# Patient Record
Sex: FEMALE | Race: WHITE | NOT HISPANIC OR LATINO | Employment: FULL TIME | URBAN - NONMETROPOLITAN AREA
[De-identification: names, ages, dates, MRNs, and addresses within clinical notes are randomized per-mention and may not be internally consistent; named-entity substitution may affect disease eponyms.]

---

## 2018-06-19 ENCOUNTER — LAB (OUTPATIENT)
Dept: LAB | Facility: HOSPITAL | Age: 48
End: 2018-06-19
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
## 2018-06-19 PROCEDURE — 86618 LYME DISEASE ANTIBODY: CPT | Performed by: NURSE PRACTITIONER

## 2018-06-19 PROCEDURE — 86757 RICKETTSIA ANTIBODY: CPT

## 2018-06-19 PROCEDURE — 86666 EHRLICHIA ANTIBODY: CPT | Performed by: NURSE PRACTITIONER

## 2018-06-22 LAB
R RICKETTSI IGG SER QL IA: POSITIVE
R RICKETTSI IGM TITR SER: 1.13 INDEX (ref 0–0.89)

## 2018-06-29 ENCOUNTER — TELEPHONE (OUTPATIENT)
Dept: FAMILY MEDICINE CLINIC | Facility: CLINIC | Age: 48
End: 2018-06-29

## 2018-06-29 ENCOUNTER — OFFICE VISIT (OUTPATIENT)
Dept: FAMILY MEDICINE CLINIC | Facility: CLINIC | Age: 48
End: 2018-06-29

## 2018-06-29 VITALS
HEIGHT: 66 IN | DIASTOLIC BLOOD PRESSURE: 64 MMHG | WEIGHT: 193 LBS | BODY MASS INDEX: 31.02 KG/M2 | SYSTOLIC BLOOD PRESSURE: 108 MMHG
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
## 2018-06-29 DIAGNOSIS — R53.81 MALAISE AND FATIGUE: ICD-10-CM

## 2018-06-29 DIAGNOSIS — Z12.31 ENCOUNTER FOR SCREENING MAMMOGRAM FOR MALIGNANT NEOPLASM OF BREAST: ICD-10-CM

## 2018-06-29 DIAGNOSIS — R53.83 MALAISE AND FATIGUE: ICD-10-CM

## 2018-06-29 DIAGNOSIS — Z00.00 GENERAL MEDICAL EXAM: ICD-10-CM

## 2018-06-29 DIAGNOSIS — A77.0 RMSF (ROCKY MOUNTAIN SPOTTED FEVER): Primary | ICD-10-CM

## 2018-06-29 DIAGNOSIS — Z12.11 ENCOUNTER FOR SCREENING COLONOSCOPY: ICD-10-CM

## 2018-06-29 PROCEDURE — 99213 OFFICE O/P EST LOW 20 MIN: CPT | Performed by: NURSE PRACTITIONER

## 2018-06-29 RX ORDER — DOXYCYCLINE HYCLATE 100 MG/1
100 CAPSULE ORAL 2 TIMES DAILY
Qty: 14 CAPSULE | Refills: 0 | Status: SHIPPED | OUTPATIENT
Start: 2018-06-29 | End: 2018-07-13

## 2018-06-29 NOTE — PROGRESS NOTES
Chief Complaint   Patient presents with   • Establish Care     has seen in the past    • RMSF     Subjective   Pati Prado is a 48 y.o. female.     Presents with follow up from urgent care -dx with rmsf       Rash   This is a new problem. The current episode started in the past 7 days. The problem has been gradually worsening since onset. The affected locations include the right ankle. The rash is characterized by pain and itchiness. Associated with: ticks  Associated symptoms include fatigue, a fever and joint pain. Pertinent negatives include no anorexia, congestion, cough, diarrhea, eye pain, facial edema, nail changes, rhinorrhea, shortness of breath, sore throat or vomiting. Past treatments include anti-itch cream (few days on doxycycline ). The treatment provided mild relief. There is no history of allergies, asthma, eczema or varicella.   Fatigue   This is a recurrent problem. The current episode started more than 1 month ago. The problem occurs intermittently. The problem has been gradually worsening. Associated symptoms include abdominal pain, diaphoresis, fatigue, a fever and a rash. Pertinent negatives include no anorexia, arthralgias, change in bowel habit, chest pain, chills, congestion, coughing, headaches, joint swelling, nausea, neck pain, sore throat, swollen glands, urinary symptoms, vertigo, visual change or vomiting. Treatments tried: doxycycline  The treatment provided mild relief.        The following portions of the patient's history were reviewed and updated as appropriate: allergies, current medications, past social history and problem list.    Review of Systems   Constitutional: Positive for activity change, diaphoresis, fatigue, fever and unexpected weight change. Negative for appetite change and chills.   HENT: Negative.  Negative for congestion, dental problem, drooling, ear discharge, ear pain, rhinorrhea and sore throat.    Eyes: Negative.  Negative for pain, discharge and  "itching.   Respiratory: Negative.  Negative for apnea, cough, choking, chest tightness, shortness of breath, wheezing and stridor.    Cardiovascular: Negative.  Negative for chest pain, palpitations and leg swelling.   Gastrointestinal: Positive for abdominal pain. Negative for anal bleeding, anorexia, change in bowel habit, diarrhea, nausea and vomiting.   Endocrine: Negative.    Genitourinary: Negative.    Musculoskeletal: Positive for joint pain. Negative for arthralgias, joint swelling and neck pain.   Skin: Positive for rash. Negative for nail changes.        Rash from tick bite-see urgent care for more info    Allergic/Immunologic: Negative.  Negative for environmental allergies, food allergies and immunocompromised state.   Neurological: Negative.  Negative for dizziness, vertigo, facial asymmetry and headaches.   Hematological: Negative.    Psychiatric/Behavioral: Negative.        Objective   /64   Ht 167.6 cm (66\")   Wt 87.5 kg (193 lb)   BMI 31.15 kg/m²   Physical Exam   Constitutional: She is oriented to person, place, and time. She appears well-developed and well-nourished.   HENT:   Head: Normocephalic and atraumatic.   Right Ear: External ear normal.   Left Ear: External ear normal.   Eyes: EOM are normal. Pupils are equal, round, and reactive to light. Right eye exhibits no discharge. Left eye exhibits no discharge. No scleral icterus.   Neck: Normal range of motion. Neck supple. No JVD present. No tracheal deviation present. No thyromegaly present.   Cardiovascular: Normal rate, regular rhythm and normal heart sounds.    Pulmonary/Chest: Effort normal and breath sounds normal. No stridor. No respiratory distress. She has no wheezes. She has no rales. She exhibits no tenderness.   Abdominal: Soft. Bowel sounds are normal. She exhibits no distension and no mass. There is no tenderness. There is no rebound and no guarding. No hernia.   Musculoskeletal: Normal range of motion. She exhibits " tenderness.   Lymphadenopathy:     She has no cervical adenopathy.   Neurological: She is alert and oriented to person, place, and time. She displays normal reflexes. No cranial nerve deficit. Coordination normal.   Skin: Skin is warm and dry. Rash noted. No erythema. No pallor.   Rash right posterior back from tick bite    Nursing note and vitals reviewed.            Assessment/Plan   Problem List Items Addressed This Visit        Musculoskeletal and Integument    Tick bite    Relevant Medications    doxycycline (VIBRAMYCIN) 100 MG capsule    Other Relevant Orders    CBC & Differential    Comprehensive Metabolic Panel    Iron    Hepatitis C Antibody    Vitamin D 25 Hydroxy    Vitamin B12    TSH    Magnesium    Lipid Panel    Lyme Disease IgG/IgM Antibodies       Other    Malaise and fatigue    Relevant Orders    CBC & Differential    Comprehensive Metabolic Panel    Iron    Hepatitis C Antibody    Vitamin D 25 Hydroxy    Vitamin B12    TSH    Magnesium    Lipid Panel    Lyme Disease IgG/IgM Antibodies    RMSF ([place] spotted fever) - Primary    Relevant Medications    doxycycline (VIBRAMYCIN) 100 MG capsule    Other Relevant Orders    CBC & Differential    Comprehensive Metabolic Panel    Iron    Hepatitis C Antibody    Vitamin D 25 Hydroxy    Vitamin B12    TSH    Magnesium    Lipid Panel    Lyme Disease IgG/IgM Antibodies    General medical exam    Relevant Orders    CBC & Differential    Comprehensive Metabolic Panel    Iron    Hepatitis C Antibody    Vitamin D 25 Hydroxy    Vitamin B12    TSH    Magnesium    Lipid Panel    Lyme Disease IgG/IgM Antibodies    Encounter for screening mammogram for malignant neoplasm of breast    Relevant Orders    Mammo Screening Digital Tomosynthesis Bilateral With CAD           New Medications Ordered This Visit   Medications   • doxycycline (VIBRAMYCIN) 100 MG capsule     Sig: Take 1 capsule by mouth 2 (Two) Times a Day for 14 days.     Dispense:  14 capsule     Refill:   0       It's not just what you eat, but when you eat  Eat breakfast, and eat smaller meals throughout the day. A healthy breakfast can jumpstart your metabolism, while eating small, healthy meals (rather than the standard three large meals) keeps your energy up.   Avoid eating at night. Try to eat dinner earlier and fast for 14-16 hours until breakfast the next morning. Studies suggest that eating only when you’re most active and giving your digestive system a long break each day may help to regulate weight.     Recheck labs in 6 weeks-diet discussed, meds as directed, finish doxy

## 2018-07-12 ENCOUNTER — PREP FOR SURGERY (OUTPATIENT)
Dept: OTHER | Facility: HOSPITAL | Age: 48
End: 2018-07-12

## 2018-07-12 DIAGNOSIS — Z12.11 SCREEN FOR COLON CANCER: Primary | ICD-10-CM

## 2018-07-12 RX ORDER — DEXTROSE AND SODIUM CHLORIDE 5; .45 G/100ML; G/100ML
100 INJECTION, SOLUTION INTRAVENOUS CONTINUOUS
Status: CANCELLED | OUTPATIENT
Start: 2018-07-27

## 2018-07-13 PROBLEM — Z12.11 SCREEN FOR COLON CANCER: Status: ACTIVE | Noted: 2018-07-13

## 2018-07-23 ENCOUNTER — OFFICE VISIT (OUTPATIENT)
Dept: FAMILY MEDICINE CLINIC | Facility: CLINIC | Age: 48
End: 2018-07-23

## 2018-07-23 ENCOUNTER — TELEPHONE (OUTPATIENT)
Dept: FAMILY MEDICINE CLINIC | Facility: CLINIC | Age: 48
End: 2018-07-23

## 2018-07-23 ENCOUNTER — LAB (OUTPATIENT)
Dept: LAB | Facility: HOSPITAL | Age: 48
End: 2018-07-23

## 2018-07-23 VITALS
SYSTOLIC BLOOD PRESSURE: 120 MMHG | DIASTOLIC BLOOD PRESSURE: 90 MMHG | HEART RATE: 79 BPM | HEIGHT: 63 IN | TEMPERATURE: 98.5 F | WEIGHT: 195 LBS | BODY MASS INDEX: 34.55 KG/M2

## 2018-07-23 DIAGNOSIS — A77.0 RMSF (ROCKY MOUNTAIN SPOTTED FEVER): ICD-10-CM

## 2018-07-23 DIAGNOSIS — F41.9 ANXIETY: ICD-10-CM

## 2018-07-23 DIAGNOSIS — Z00.00 GENERAL MEDICAL EXAM: ICD-10-CM

## 2018-07-23 DIAGNOSIS — R53.83 MALAISE AND FATIGUE: Primary | ICD-10-CM
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
## 2018-07-23 DIAGNOSIS — R53.81 MALAISE AND FATIGUE: ICD-10-CM

## 2018-07-23 DIAGNOSIS — R53.83 MALAISE AND FATIGUE: ICD-10-CM

## 2018-07-23 DIAGNOSIS — Z00.00 GENERAL MEDICAL EXAM: Primary | ICD-10-CM

## 2018-07-23 DIAGNOSIS — R53.81 MALAISE AND FATIGUE: Primary | ICD-10-CM

## 2018-07-23 LAB
25(OH)D3 SERPL-MCNC: 35.5 NG/ML (ref 30–100)
ALBUMIN SERPL-MCNC: 4.1 G/DL (ref 3.4–4.8)
ALBUMIN/GLOB SERPL: 1.2 G/DL (ref 1.1–1.8)
ALP SERPL-CCNC: 72 U/L (ref 38–126)
ALT SERPL W P-5'-P-CCNC: 35 U/L (ref 9–52)
ANION GAP SERPL CALCULATED.3IONS-SCNC: 7 MMOL/L (ref 5–15)
ARTICHOKE IGE QN: 70 MG/DL (ref 1–129)
AST SERPL-CCNC: 36 U/L (ref 14–36)
BASOPHILS # BLD AUTO: 0.07 10*3/MM3 (ref 0–0.2)
BASOPHILS NFR BLD AUTO: 1 % (ref 0–2)
BILIRUB SERPL-MCNC: 0.6 MG/DL (ref 0.2–1.3)
BUN BLD-MCNC: 16 MG/DL (ref 7–21)
BUN/CREAT SERPL: 27.6 (ref 7–25)
CALCIUM SPEC-SCNC: 8.8 MG/DL (ref 8.4–10.2)
CHLORIDE SERPL-SCNC: 107 MMOL/L (ref 95–110)
CHOLEST SERPL-MCNC: 153 MG/DL (ref 0–199)
CO2 SERPL-SCNC: 25 MMOL/L (ref 22–31)
CREAT BLD-MCNC: 0.58 MG/DL (ref 0.5–1)
DEPRECATED RDW RBC AUTO: 44.2 FL (ref 36.4–46.3)
EOSINOPHIL # BLD AUTO: 0.21 10*3/MM3 (ref 0–0.7)
EOSINOPHIL NFR BLD AUTO: 3.1 % (ref 0–7)
ERYTHROCYTE [DISTWIDTH] IN BLOOD BY AUTOMATED COUNT: 13.1 % (ref 11.5–14.5)
GFR SERPL CREATININE-BSD FRML MDRD: 110 ML/MIN/1.73 (ref 51–120)
GLOBULIN UR ELPH-MCNC: 3.4 GM/DL (ref 2.3–3.5)
GLUCOSE BLD-MCNC: 90 MG/DL (ref 60–100)
HCT VFR BLD AUTO: 39.7 % (ref 35–45)
HDLC SERPL-MCNC: 57 MG/DL (ref 60–200)
HGB BLD-MCNC: 13.3 G/DL (ref 12–15.5)
IMM GRANULOCYTES # BLD: 0.03 10*3/MM3 (ref 0–0.02)
IMM GRANULOCYTES NFR BLD: 0.4 % (ref 0–0.5)
IRON 24H UR-MRATE: 85 MCG/DL (ref 37–170)
LDLC/HDLC SERPL: 1.42 {RATIO} (ref 0–3.22)
LYMPHOCYTES # BLD AUTO: 1.69 10*3/MM3 (ref 0.6–4.2)
LYMPHOCYTES NFR BLD AUTO: 25.1 % (ref 10–50)
MAGNESIUM SERPL-MCNC: 2.2 MG/DL (ref 1.6–2.3)
MCH RBC QN AUTO: 30.8 PG (ref 26.5–34)
MCHC RBC AUTO-ENTMCNC: 33.5 G/DL (ref 31.4–36)
MCV RBC AUTO: 91.9 FL (ref 80–98)
MONOCYTES # BLD AUTO: 0.51 10*3/MM3 (ref 0–0.9)
MONOCYTES NFR BLD AUTO: 7.6 % (ref 0–12)
NEUTROPHILS # BLD AUTO: 4.22 10*3/MM3 (ref 2–8.6)
NEUTROPHILS NFR BLD AUTO: 62.8 % (ref 37–80)
PLATELET # BLD AUTO: 359 10*3/MM3 (ref 150–450)
PMV BLD AUTO: 9.7 FL (ref 8–12)
POTASSIUM BLD-SCNC: 4 MMOL/L (ref 3.5–5.1)
PROT SERPL-MCNC: 7.5 G/DL (ref 6.3–8.6)
RBC # BLD AUTO: 4.32 10*6/MM3 (ref 3.77–5.16)
SODIUM BLD-SCNC: 139 MMOL/L (ref 137–145)
TRIGL SERPL-MCNC: 76 MG/DL (ref 20–199)
TSH SERPL DL<=0.05 MIU/L-ACNC: 2.39 MIU/ML (ref 0.46–4.68)
VIT B12 BLD-MCNC: 401 PG/ML (ref 239–931)
WBC NRBC COR # BLD: 6.73 10*3/MM3 (ref 3.2–9.8)

## 2018-07-23 PROCEDURE — 82306 VITAMIN D 25 HYDROXY: CPT | Performed by: NURSE PRACTITIONER

## 2018-07-23 PROCEDURE — 86618 LYME DISEASE ANTIBODY: CPT

## 2018-07-23 PROCEDURE — 36415 COLL VENOUS BLD VENIPUNCTURE: CPT | Performed by: NURSE PRACTITIONER

## 2018-07-23 PROCEDURE — 83735 ASSAY OF MAGNESIUM: CPT | Performed by: NURSE PRACTITIONER

## 2018-07-23 PROCEDURE — 84443 ASSAY THYROID STIM HORMONE: CPT | Performed by: NURSE PRACTITIONER

## 2018-07-23 PROCEDURE — 99213 OFFICE O/P EST LOW 20 MIN: CPT | Performed by: NURSE PRACTITIONER

## 2018-07-23 PROCEDURE — 86803 HEPATITIS C AB TEST: CPT | Performed by: NURSE PRACTITIONER

## 2018-07-23 PROCEDURE — 82607 VITAMIN B-12: CPT | Performed by: NURSE PRACTITIONER

## 2018-07-23 PROCEDURE — 80061 LIPID PANEL: CPT | Performed by: NURSE PRACTITIONER

## 2018-07-23 PROCEDURE — 80053 COMPREHEN METABOLIC PANEL: CPT | Performed by: NURSE PRACTITIONER

## 2018-07-23 PROCEDURE — 83540 ASSAY OF IRON: CPT | Performed by: NURSE PRACTITIONER

## 2018-07-23 PROCEDURE — 85025 COMPLETE CBC W/AUTO DIFF WBC: CPT | Performed by: NURSE PRACTITIONER

## 2018-07-23 RX ORDER — FLUOXETINE 10 MG/1
10 CAPSULE ORAL DAILY
Qty: 30 CAPSULE | Refills: 11 | Status: SHIPPED | OUTPATIENT
Start: 2018-07-23 | End: 2019-08-14

## 2018-07-23 RX ORDER — BUSPIRONE HYDROCHLORIDE 10 MG/1
10 TABLET ORAL 3 TIMES DAILY
Qty: 90 TABLET | Refills: 5 | Status: SHIPPED | OUTPATIENT
Start: 2018-07-23 | End: 2019-08-14

## 2018-07-23 NOTE — TELEPHONE ENCOUNTER
BRYAN Rojas, Ms. Prado has been called with her recent lab results & recommendations.  Continue her current medications and follow-up as planned or sooner if any problems.        ----- Message from BRYAN Garrison sent at 7/23/2018  1:31 PM CDT -----  Can you let her know her labs look good-otherwise no problems noted
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
## 2018-07-24 LAB — B BURGDOR IGG+IGM SER-ACNC: <0.91 ISR (ref 0–0.9)

## 2018-07-25 ENCOUNTER — TELEPHONE (OUTPATIENT)
Dept: FAMILY MEDICINE CLINIC | Facility: CLINIC | Age: 48
End: 2018-07-25

## 2018-07-25 LAB — HCV AB SER DONR QL: NEGATIVE

## 2018-07-25 NOTE — TELEPHONE ENCOUNTER
BRYAN Rojas, Ms. Prado has been called with her recent lab results & recommendations.  Continue her current medications and follow-up as planned or sooner if any problems.      ----- Message from BRYAN Garrison sent at 7/25/2018  8:09 AM CDT -----  Can you let her know her labs are good-lyme neg

## 2018-07-25 NOTE — PROGRESS NOTES
BRYAN Rojas, Ms. Prado has been called with her recent lab results & recommendations.  Continue her current medications and follow-up as planned or sooner if any problems.

## 2019-06-29 ENCOUNTER — APPOINTMENT (OUTPATIENT)
Dept: CT IMAGING | Facility: HOSPITAL | Age: 49
End: 2019-06-29

## 2019-06-29 ENCOUNTER — HOSPITAL ENCOUNTER (EMERGENCY)
Facility: HOSPITAL | Age: 49
Discharge: HOME | End: 2019-06-29
Attending: EMERGENCY MEDICINE | Admitting: EMERGENCY MEDICINE

## 2019-06-29 VITALS
HEART RATE: 92 BPM | RESPIRATION RATE: 20 BRPM | OXYGEN SATURATION: 99 % | HEIGHT: 63 IN | DIASTOLIC BLOOD PRESSURE: 87 MMHG | BODY MASS INDEX: 34.55 KG/M2 | SYSTOLIC BLOOD PRESSURE: 147 MMHG | TEMPERATURE: 98.6 F | WEIGHT: 195 LBS

## 2019-06-29 DIAGNOSIS — M54.42 ACUTE MIDLINE LOW BACK PAIN WITH LEFT-SIDED SCIATICA: Primary | ICD-10-CM

## 2019-06-29 PROCEDURE — 25010000002 DEXAMETHASONE: Performed by: EMERGENCY MEDICINE

## 2019-06-29 PROCEDURE — 25010000002 MORPHINE PER 10 MG: Performed by: EMERGENCY MEDICINE

## 2019-06-29 PROCEDURE — 96375 TX/PRO/DX INJ NEW DRUG ADDON: CPT

## 2019-06-29 PROCEDURE — 99284 EMERGENCY DEPT VISIT MOD MDM: CPT

## 2019-06-29 PROCEDURE — 96374 THER/PROPH/DIAG INJ IV PUSH: CPT

## 2019-06-29 PROCEDURE — 72131 CT LUMBAR SPINE W/O DYE: CPT

## 2019-06-29 PROCEDURE — 25010000002 ORPHENADRINE CITRATE PER 60 MG: Performed by: EMERGENCY MEDICINE

## 2019-06-29 PROCEDURE — 25010000002 KETOROLAC TROMETHAMINE PER 15 MG: Performed by: EMERGENCY MEDICINE

## 2019-06-29 RX ORDER — OXYCODONE AND ACETAMINOPHEN 10; 325 MG/1; MG/1
1 TABLET ORAL EVERY 6 HOURS PRN
Qty: 15 TABLET | Refills: 0 | Status: SHIPPED | OUTPATIENT
Start: 2019-06-29 | End: 2019-07-02

## 2019-06-29 RX ORDER — CYCLOBENZAPRINE HCL 10 MG
10 TABLET ORAL 3 TIMES DAILY PRN
Qty: 30 TABLET | Refills: 0 | Status: SHIPPED | OUTPATIENT
Start: 2019-06-29 | End: 2019-08-14

## 2019-06-29 RX ORDER — KETOROLAC TROMETHAMINE 15 MG/ML
30 INJECTION, SOLUTION INTRAMUSCULAR; INTRAVENOUS ONCE
Status: COMPLETED | OUTPATIENT
Start: 2019-06-29 | End: 2019-06-29

## 2019-06-29 RX ORDER — ORPHENADRINE CITRATE 30 MG/ML
60 INJECTION INTRAMUSCULAR; INTRAVENOUS ONCE
Status: COMPLETED | OUTPATIENT
Start: 2019-06-29 | End: 2019-06-29

## 2019-06-29 RX ORDER — PREDNISONE 20 MG/1
60 TABLET ORAL DAILY
Qty: 15 TABLET | Refills: 0 | Status: SHIPPED | OUTPATIENT
Start: 2019-06-29 | End: 2019-08-14

## 2019-06-29 RX ADMIN — DEXAMETHASONE SODIUM PHOSPHATE 10 MG: 10 INJECTION INTRAMUSCULAR; INTRAVENOUS at 19:32

## 2019-06-29 RX ADMIN — MORPHINE SULFATE 4 MG: 4 INJECTION INTRAVENOUS at 19:16

## 2019-06-29 RX ADMIN — ORPHENADRINE CITRATE 60 MG: 30 INJECTION INTRAMUSCULAR; INTRAVENOUS at 17:32

## 2019-06-29 RX ADMIN — KETOROLAC TROMETHAMINE 30 MG: 15 INJECTION, SOLUTION INTRAMUSCULAR; INTRAVENOUS at 17:31

## 2019-06-30 ENCOUNTER — HOSPITAL ENCOUNTER (EMERGENCY)
Facility: HOSPITAL | Age: 49
Discharge: HOME | End: 2019-06-30
Attending: EMERGENCY MEDICINE | Admitting: EMERGENCY MEDICINE

## 2019-06-30 VITALS
RESPIRATION RATE: 18 BRPM | HEIGHT: 62 IN | BODY MASS INDEX: 35.9 KG/M2 | OXYGEN SATURATION: 95 % | DIASTOLIC BLOOD PRESSURE: 78 MMHG | TEMPERATURE: 97.8 F | HEART RATE: 94 BPM | SYSTOLIC BLOOD PRESSURE: 132 MMHG | WEIGHT: 195.11 LBS

## 2019-06-30 DIAGNOSIS — R20.2 NUMBNESS AND TINGLING OF LEFT LEG: Primary | ICD-10-CM

## 2019-06-30 DIAGNOSIS — R20.0 NUMBNESS AND TINGLING OF LEFT LEG: Primary | ICD-10-CM

## 2019-06-30 PROCEDURE — 99282 EMERGENCY DEPT VISIT SF MDM: CPT

## 2019-07-02 ENCOUNTER — OFFICE VISIT (OUTPATIENT)
Dept: ORTHOPEDIC SURGERY | Facility: CLINIC | Age: 49
End: 2019-07-02

## 2019-07-02 VITALS
OXYGEN SATURATION: 98 % | SYSTOLIC BLOOD PRESSURE: 122 MMHG | HEIGHT: 62 IN | DIASTOLIC BLOOD PRESSURE: 80 MMHG | BODY MASS INDEX: 37.37 KG/M2 | WEIGHT: 203.1 LBS | HEART RATE: 87 BPM

## 2019-07-02 DIAGNOSIS — M54.32 SCIATICA OF LEFT SIDE: ICD-10-CM

## 2019-07-02 DIAGNOSIS — M25.552 LEFT HIP PAIN: ICD-10-CM

## 2019-07-02 DIAGNOSIS — M79.605 LEFT LEG PAIN: ICD-10-CM

## 2019-07-02 DIAGNOSIS — M54.5 LOW BACK PAIN, UNSPECIFIED BACK PAIN LATERALITY, UNSPECIFIED CHRONICITY, WITH SCIATICA PRESENCE UNSPECIFIED: Primary | ICD-10-CM

## 2019-07-02 PROBLEM — M54.50 LOW BACK PAIN: Status: ACTIVE | Noted: 2019-07-02

## 2019-07-02 PROCEDURE — 99203 OFFICE O/P NEW LOW 30 MIN: CPT | Performed by: ORTHOPAEDIC SURGERY

## 2019-07-02 RX ORDER — OXYCODONE AND ACETAMINOPHEN 10; 325 MG/1; MG/1
1 TABLET ORAL EVERY 6 HOURS PRN
Qty: 30 TABLET | Refills: 0 | Status: SHIPPED | OUTPATIENT
Start: 2019-07-02 | End: 2019-08-14

## 2019-07-02 NOTE — PROGRESS NOTES
"Pati Prado is a 49 y.o. female   Primary provider:  Selam Lorenzo APRN       No chief complaint on file.      HISTORY OF PRESENT ILLNESS:  New patient left back, hip, and leg, patient has tingling from lower back to tips of toes, patient had CT done on 6/29/19, patient states pain score is at a 8 today,     This is the first office visit for evaluation of pain in the back and left leg.    Ms. Prado is 49 years old.  She says she has had some intermittent problems with her back chronically.  3 days ago she had spontaneous onset of pain initially in the low back and buttocks there is been no recent trauma.  As her symptoms involved pain is extended into the left thigh.  2 days ago she began having tingling in all toes and left foot.  She also describes a  \" rubber band feeling \" the left thigh.  Her symptoms have been fairly constant in nature.  She was seen in the emergency room 3 days ago and a CT scan was performed.  I spoke with  by phone.  He indicated she was given a dose of parenteral steroids and was also given a oral course of steroids.  Gives a history of what sounds like stress urinary incontinence.  She also had one episode of loss of bowel control.  She says this was associated with diarrhea and sounds like it may be an episode of rectal urgency.  Dr. Al indicated that rectal exam showed normal perianal sensation and perhaps slightly decreased rectal tone.  In the past she has been followed by a chiropractor and was told previously that she had sciatica.  She says that her current symptoms are much more severe than any she has had in the past.    Current medications include Prozac BuSpar Flexeril and 10 mg Percocet.  She has no drug allergies.  Past medical history is remarkable for depression and chronic back pain.  She does not smoke.  She is  and is employed as a .      Pain   This is a new problem. The current episode started in the past 7 days. " The problem occurs constantly. The problem has been unchanged. Associated symptoms include numbness. Associated symptoms comments: Stabbing, burning. Nothing aggravates the symptoms. She has tried NSAIDs for the symptoms. The treatment provided no relief.        CONCURRENT MEDICAL HISTORY:    Past Medical History:   Diagnosis Date   • Contact dermatitis due to plant    • Contusion of right shoulder     initial encounter   • Depressive disorder    • Disorder of teeth and supporting structures    • Knee pain    • Low back pain    • Rash    • [place] spotted fever        No Known Allergies      Current Outpatient Medications:   •  busPIRone (BUSPAR) 10 MG tablet, Take 1 tablet by mouth 3 (Three) Times a Day. Tid prn for anxiety, Disp: 90 tablet, Rfl: 5  •  cyclobenzaprine (FLEXERIL) 10 MG tablet, Take 1 tablet by mouth 3 (Three) Times a Day As Needed for Muscle Spasms., Disp: 30 tablet, Rfl: 0  •  FLUoxetine (PROZAC) 10 MG capsule, Take 1 capsule by mouth Daily., Disp: 30 capsule, Rfl: 11  •  predniSONE (DELTASONE) 20 MG tablet, Take 3 tablets by mouth Daily., Disp: 15 tablet, Rfl: 0  •  oxyCODONE-acetaminophen (PERCOCET)  MG per tablet, Take 1 tablet by mouth Every 6 (Six) Hours As Needed for Moderate Pain ., Disp: 30 tablet, Rfl: 0    Past Surgical History:   Procedure Laterality Date   • COLONOSCOPY N/A 7/27/2018    Procedure: COLONOSCOPY;  Surgeon: Colt Fragoso MD;  Location: [facility] ENDOSCOPY;  Service: Gastroenterology   • INJECTION OF MEDICATION  03/27/2015    kenalog       History reviewed. No pertinent family history.    Social History     Socioeconomic History   • Marital status:      Spouse name: Not on file   • Number of children: Not on file   • Years of education: Not on file   • Highest education level: Not on file   Tobacco Use   • Smoking status: Never Smoker   • Smokeless tobacco: Never Used   Substance and Sexual Activity   • Alcohol use: Yes     Comment: social   • Drug use:  "No   • Sexual activity: Defer        Review of Systems   Neurological: Positive for numbness.       PHYSICAL EXAMINATION:       /80   Pulse 87   Ht 157.5 cm (62\")   Wt 92.1 kg (203 lb 1.6 oz)   LMP 06/24/2019   SpO2 98%   BMI 37.15 kg/m²     Physical Exam General she is alert and apparent mild discomfort.  She appears mentally restless.  She responds appropriately to questions and commands.    GAIT:     []  Normal  [x]  Antalgic    Assistive device: [x]  None  []  Walker     []  Crutches  []  Cane     []  Wheelchair  []  Stretcher    Ortho Exam she walks with a mild limp favoring the left.  Trunk flexion will bring her fingertips to mid leg with complaints of low back pain.  There is mild tenderness diffusely in the left buttock and left low back including over the greater trochanter.  There is no palpable abnormality in the back.  Tripod testing is negative on the right and very mildly positive on the left.  Knee jerks are trace positive symmetrically.  The left heel jerk is absent compared to 2+ on the right.  Dorsalis pedis pulses 2+ symmetrically.  Sensory exam is intact to soft touch in lower extremities.  There is normal muscle strength in ankle flexion and extension, great toe flexion and extension, and eversion.  She has quite a bit of discomfort when lying supine.  It appears the left lower extremity is shorter than the right by less than 1 cm.  Right leg raising is positive on the left at 60 degrees with a positive reverse sciatic stretch test and mildly positive sciatic stretch test.  The abdomen is soft obese and nontender with poor abdominal muscle tone.    CT scan of the lumbar spine done recently was reviewed.  There is mild to moderate lateral stenosis at L4-5 where there is facet arthrosis at L5-S1.  There is appears to be a left paracentral disc herniation at L5-S1 with displacement of the S1 nerve root.  Plain films of the lumbar spine done in January 2017 show incomplete sacralization " of L5 with loss of normal lumbar lordosis.  The disc spaces were unremarkable.        Ct Lumbar Spine Without Contrast    Result Date: 6/29/2019  Narrative: EXAMINATION:   Computed Tomography, Spine   REGION:      lumbar spine     INDICATION:    Pain    HISTORY: KENYATTA. IMAGING:     none     TECHNIQUE:    - field of view:      small field-of-view limited to the vertebrae     - reconstructions:    axial, coronal, sagittal      - contrast:                 intravenous:   none      ; Intrathecal:  none       This exam was performed according to the departmental dose-optimization program which includes automated exposure control, adjustment of the mA and/or kV according to patient size and/or use of iterative reconstruction technique.       COMMENTS:        Fracture:        no evidence of fracture    Alignment:           normal spinous alignment    Disc space heights:   Within normal limits. At the L5/S1 level is focal soft tissue density in the left paracentral region extending to the left lateral recess possibly representing a herniated disc.        Canal caliber:  unremarkable    Bone density:    grossly within normal limits for age      Focal lesion(s):    none visualized    Soft tissue:     unremarkable     Misc.:    unremarkable          Impression: CONCLUSION:    1.  No evidence of acute traumatic osseous injury    2. Indeterminate findings at the L5/S1 level which may be associated with herniated disc. Recommend correlation with MRI.   Please note:   this exam technique has limited sensitivity for pathology of the intervertebral discs, spinal cord and spinal canal.              Electronically signed by:  AMBER Byrne MD  6/29/2019 6:51 PM CDT Workstation: [id number]          ASSESSMENT: Left sciatica secondary to disc herniation L5-S1.    The natural history of the disorder was discussed and treatment options reviewed.  She was given the [employer] website and she was instructed in activity restriction.  She was  told to complete her course of steroids.  He has another 3 to 4 days left of her prescription.  He requested a refill of her Percocet and he was given a prescription for 30 Percocet 10 mg each take as needed for pain.  As her pain improves she will transition to over-the-counter medications.  I do not think she will be able to return to her regular work until her symptoms improved significantly.  She understands if her symptoms do not improve we may consider further imaging of the spine with an MRI scan and possible lumbar epidural steroid injection.  At present I see no surgical indications.    Diagnoses and all orders for this visit:    Low back pain, unspecified back pain laterality, unspecified chronicity, with sciatica presence unspecified    Left hip pain    Left leg pain    Sciatica of left side  -     oxyCODONE-acetaminophen (PERCOCET)  MG per tablet; Take 1 tablet by mouth Every 6 (Six) Hours As Needed for Moderate Pain .          PLAN    Return in about 2 weeks (around 7/16/2019).    Pee Duran MD

## 2019-07-16 ENCOUNTER — OFFICE VISIT (OUTPATIENT)
Dept: ORTHOPEDIC SURGERY | Facility: CLINIC | Age: 49
End: 2019-07-16

## 2019-07-16 VITALS
HEART RATE: 80 BPM | OXYGEN SATURATION: 95 % | WEIGHT: 203 LBS | BODY MASS INDEX: 37.36 KG/M2 | DIASTOLIC BLOOD PRESSURE: 80 MMHG | SYSTOLIC BLOOD PRESSURE: 120 MMHG | HEIGHT: 62 IN

## 2019-07-16 DIAGNOSIS — M54.42 ACUTE LOW BACK PAIN WITH LEFT-SIDED SCIATICA, UNSPECIFIED BACK PAIN LATERALITY: ICD-10-CM

## 2019-07-16 DIAGNOSIS — M25.552 LEFT HIP PAIN: Primary | ICD-10-CM

## 2019-07-16 DIAGNOSIS — M79.605 LEFT LEG PAIN: ICD-10-CM

## 2019-07-16 PROCEDURE — 99212 OFFICE O/P EST SF 10 MIN: CPT | Performed by: ORTHOPAEDIC SURGERY

## 2019-07-16 NOTE — PROGRESS NOTES
"Pati Prado is a 49 y.o. female returns for     Chief Complaint   Patient presents with   • Left Hip - Follow-up   • Left Leg - Follow-up       HISTORY OF PRESENT ILLNESS:  F/u left hip and left leg, patient states pain score is at a 4 today,     Ms. Prado says her pain has improved.  She is taking Percocet only occasionally.  Although her pain has improved she said it is \" nothing I can live with\".  She complains of numbness and tingling in the buttock with radiation to the posterior thigh and also pain in the left heel and ankle.  She has had no right leg symptoms and has had no further bowel or bladder problems.     CONCURRENT MEDICAL HISTORY:    The following portions of the patient's history were reviewed and updated as appropriate: allergies, current medications, past family history, past medical history, past social history, past surgical history and problem list.     ROS  No fevers or chills.  No chest pain or shortness of air.  No GI or  disturbances.    PHYSICAL EXAMINATION:       /80   Pulse 80   Ht 157.5 cm (62\")   Wt 92.1 kg (203 lb)   LMP 06/24/2019   SpO2 95%   BMI 37.13 kg/m²     Physical Exam she is alert and apparent mild discomfort at rest.    GAIT:     [x]  Normal  [x]  Antalgic    Assistive device: []  None  []  Walker     []  Crutches  []  Cane     []  Wheelchair  []  Stretcher    Ortho Exam she walks with a faint limp favoring the left.  Left heel jerk remains absent.  Sensory exam is remarkable for hypaesthesia to soft touch over the lateral aspect of the foot.  Flexion and extension of the toes and ankle are normal as is ankle eversion.  Tripod testing is negative on the right mildly positive on the left.    Ct Lumbar Spine Without Contrast    Result Date: 6/29/2019  Narrative: EXAMINATION:   Computed Tomography, Spine   REGION:      lumbar spine     INDICATION:    Pain    HISTORY: [place]. IMAGING:     none     TECHNIQUE:    - field of view:      small field-of-view " limited to the vertebrae     - reconstructions:    axial, coronal, sagittal      - contrast:                 intravenous:   none      ; Intrathecal:  none       This exam was performed according to the departmental dose-optimization program which includes automated exposure control, adjustment of the mA and/or kV according to patient size and/or use of iterative reconstruction technique.       COMMENTS:        Fracture:        no evidence of fracture    Alignment:           normal spinous alignment    Disc space heights:   Within normal limits. At the L5/S1 level is focal soft tissue density in the left paracentral region extending to the left lateral recess possibly representing a herniated disc.        Canal caliber:  unremarkable    Bone density:    grossly within normal limits for age      Focal lesion(s):    none visualized    Soft tissue:     unremarkable     Misc.:    unremarkable          Impression: CONCLUSION:    1.  No evidence of acute traumatic osseous injury    2. Indeterminate findings at the L5/S1 level which may be associated with herniated disc. Recommend correlation with MRI.   Please note:   this exam technique has limited sensitivity for pathology of the intervertebral discs, spinal cord and spinal canal.              Electronically signed by:  AMBER Byrne MD  6/29/2019 6:51 PM CDT Workstation: [id number]            ASSESSMENT: Left sciatica secondary to disc herniation at L5-S1.    The natural history of the disorder was discussed with her.  She is not satisfied with her improvement although clearly she has improved clinically.  She wishes more aggressive treatment.  I recommend obtaining an MRI scan to better define her pathology.  I think she may benefit from lumbar epidural steroid injection.  She understands surgical treatment may eventually be needed at present she has no absolute indications for this.    She will continue limiting her activities as needed to help control her symptoms.   After the MRI scan is done we will contact her concerning scheduling of the epidural steroid injection.    Diagnoses and all orders for this visit:    Left hip pain  -     MRI Lumbar Spine Without Contrast; Future    Left leg pain  -     MRI Lumbar Spine Without Contrast; Future    Acute low back pain with left-sided sciatica, unspecified back pain laterality  -     MRI Lumbar Spine Without Contrast; Future          PLAN    Return in about 3 weeks (around 8/6/2019).    Pee Duran MD

## 2019-07-31 ENCOUNTER — HOSPITAL ENCOUNTER (OUTPATIENT)
Dept: MRI IMAGING | Facility: HOSPITAL | Age: 49
Discharge: HOME | End: 2019-07-31
Admitting: ORTHOPAEDIC SURGERY

## 2019-07-31 DIAGNOSIS — M25.552 LEFT HIP PAIN: ICD-10-CM

## 2019-07-31 DIAGNOSIS — M54.42 ACUTE LOW BACK PAIN WITH LEFT-SIDED SCIATICA, UNSPECIFIED BACK PAIN LATERALITY: ICD-10-CM

## 2019-07-31 DIAGNOSIS — M79.605 LEFT LEG PAIN: ICD-10-CM

## 2019-07-31 PROCEDURE — 72148 MRI LUMBAR SPINE W/O DYE: CPT

## 2019-08-14 ENCOUNTER — OFFICE VISIT (OUTPATIENT)
Dept: ORTHOPEDIC SURGERY | Facility: CLINIC | Age: 49
End: 2019-08-14

## 2019-08-14 VITALS
SYSTOLIC BLOOD PRESSURE: 125 MMHG | BODY MASS INDEX: 36.68 KG/M2 | WEIGHT: 207 LBS | DIASTOLIC BLOOD PRESSURE: 85 MMHG | HEIGHT: 63 IN

## 2019-08-14 DIAGNOSIS — M51.26 HERNIATED LUMBAR DISC WITHOUT MYELOPATHY: ICD-10-CM

## 2019-08-14 DIAGNOSIS — M54.42 ACUTE LOW BACK PAIN WITH LEFT-SIDED SCIATICA, UNSPECIFIED BACK PAIN LATERALITY: ICD-10-CM

## 2019-08-14 DIAGNOSIS — M79.605 LEFT LEG PAIN: ICD-10-CM

## 2019-08-14 DIAGNOSIS — M25.552 LEFT HIP PAIN: Primary | ICD-10-CM

## 2019-08-14 PROCEDURE — 99213 OFFICE O/P EST LOW 20 MIN: CPT | Performed by: ORTHOPAEDIC SURGERY

## 2019-08-14 NOTE — PROGRESS NOTES
"Pati Prado is a 49 y.o. female returns for     Chief Complaint   Patient presents with   • Left Hip - Follow-up   • Left Leg - Follow-up       HISTORY OF PRESENT ILLNESS: Here today for MRI results of the lumbar spine. Done on  07/31/2019 done at [facility].     Mrs. Prado says her pain has significantly improved since last visit.  She still complains of a tight feeling in the thigh with extension of her knee.       CONCURRENT MEDICAL HISTORY:    The following portions of the patient's history were reviewed and updated as appropriate: allergies, current medications, past family history, past medical history, past social history, past surgical history and problem list.         PHYSICAL EXAMINATION:       /85 (BP Location: Left arm, Patient Position: Sitting)   Ht 158.8 cm (62.5\")   Wt 93.9 kg (207 lb)   LMP 06/24/2019   BMI 37.26 kg/m²     Physical Exam she is alert and in no apparent distress.  GAIT:     [x]  Normal  []  Antalgic    Assistive device: [x]  None  []  Walker     []  Crutches  []  Cane     []  Wheelchair  []  Stretcher    Ortho Exam left heel jerk is absent.  Tripod testing is very mildly positive.  Motor exam is normal in lower extremities.  There is mild hypaesthesia soft touch over the lateral aspect of the foot.    MRI scan of the lumbar spine done recently shows decreased signal in the L4-5 and L5-S1 disc.  There is an extruded herniation of the L5-S1 disc to the left with caudal extension.      Mri Lumbar Spine Without Contrast    Result Date: 7/31/2019  Narrative: PROCEDURE: MRI LUMBAR SPINE WO CONTRAST HISTORY: Left lower extremity pain TECHNIQUE: MRI of the lumbar spine without gadolinium utilizing multiplanar and multisequential imaging. COMPARISON: CT dated 6/29/2019 FINDINGS: There is normal lumbar vertebral height. The bone marrow signal is normal. The disc spaces there is narrowed at L5-S1. There is disc dessication noted at L4-L5 and L5-S1  The bony spinal canal is " developmentally narrowed due to congenitally short pedicles. The conus medullaris terminus at a normal level. L1-L2: There is no significant spondyloarthropathy. The bilateral subarticular recess region and the bilateral neural foramina are patent without narrowing. L2-L3:There is no significant spondyloarthropathy. The bilateral subarticular recess region and the bilateral neural foramina are patent without narrowing. L3-L4:There is no significant spondyloarthropathy. The bilateral subarticular recess region and the bilateral neural foramina are patent without narrowing. L4-L5: Mild broad-based disc bulge and bilateral facet arthropathy is present. No significant central canal narrowing. Bilateral neural foramina are widely patent L5-S1: There is a left paracentral disc herniation which essentially fills the left lateral recess and demonstrates inferior migration and probably contacts the exiting nerve root at this level. There is mild bilateral neural foraminal narrowing.     Impression: Multilevel spondyloarthropathy as described above that may correlate with clinically significant relationships and clinical physical exam findings. This includes left paracentral herniated disc fragment at L5-S1. Please see the segmented detailed report above for specific level analysis. Electronically signed by:  Domi Momin MD  7/31/2019 9:15 PM CDT Workstation: [id number]            ASSESSMENT: Left sciatica secondary to disc herniation L5-S1, symptomatically improved.    The natural history of the disorder was discussed and treatment options reviewed.  In light of her symptomatic improvement I recommended no more aggressive treatment.  She was agreeable with this.  She was instructed in activity restriction.    Return here in 6 weeks if her symptoms have not steadily improved.      Diagnoses and all orders for this visit:    Left hip pain    Left leg pain    Acute low back pain with left-sided sciatica, unspecified back pain  laterality    Herniated lumbar disc without myelopathy          PLAN    Return if symptoms worsen or fail to improve.    Pee Duran MD

## 2019-09-25 ENCOUNTER — OFFICE VISIT (OUTPATIENT)
Dept: ORTHOPEDIC SURGERY | Facility: CLINIC | Age: 49
End: 2019-09-25

## 2019-09-25 VITALS — BODY MASS INDEX: 37.03 KG/M2 | WEIGHT: 209 LBS | HEIGHT: 63 IN

## 2019-09-25 DIAGNOSIS — M54.42 ACUTE LOW BACK PAIN WITH LEFT-SIDED SCIATICA, UNSPECIFIED BACK PAIN LATERALITY: ICD-10-CM

## 2019-09-25 DIAGNOSIS — M51.26 HERNIATED LUMBAR DISC WITHOUT MYELOPATHY: ICD-10-CM

## 2019-09-25 DIAGNOSIS — M25.552 LEFT HIP PAIN: Primary | ICD-10-CM

## 2019-09-25 DIAGNOSIS — M54.5 LOW BACK PAIN, UNSPECIFIED BACK PAIN LATERALITY, UNSPECIFIED CHRONICITY, WITH SCIATICA PRESENCE UNSPECIFIED: ICD-10-CM

## 2019-09-25 DIAGNOSIS — M79.605 LEFT LEG PAIN: ICD-10-CM

## 2019-09-25 PROCEDURE — 99213 OFFICE O/P EST LOW 20 MIN: CPT | Performed by: ORTHOPAEDIC SURGERY

## 2019-09-25 NOTE — PROGRESS NOTES
"Pati Prado is a 49 y.o. female returns for     Chief Complaint   Patient presents with   • Lumbar Spine - Follow-up, Pain   • Left Hip - Follow-up, Pain       HISTORY OF PRESENT ILLNESS: 6 week follow up low back pain and left leg pain.  Pain scale today. 0/10    Mrs. Prado has had steady improvement in her pain.  She will occasionally have a twinge of pain in her thigh when doing exercise walking.     CONCURRENT MEDICAL HISTORY:    The following portions of the patient's history were reviewed and updated as appropriate: allergies, current medications, past family history, past medical history, past social history, past surgical history and problem list.         PHYSICAL EXAMINATION:       Ht 158.8 cm (62.5\")   Wt 94.8 kg (209 lb)   LMP 06/24/2019   BMI 37.62 kg/m²     Physical Exam she is alert pleasant and in no apparent distress.    GAIT:     [x]  Normal  []  Antalgic    Assistive device: [x]  None  []  Walker     []  Crutches  []  Cane     []  Wheelchair  []  Stretcher    Ortho Exam she walks with a normal gait.  Left heel jerk is absent compared to 1-2+ on the right.  Tripod testing is negative bilaterally.  Strength of flexion and extension of the great toe as well as peroneal function on the left is normal.      No results found.          ASSESSMENT: Left sciatica, resolved.    The natural history of the disorder and treatment options were reviewed.  He was encouraged in aerobic conditioning exercises.  I suggested she try recumbent stationary bike.  She may gradually advance activities as tolerated.    If her symptoms recur she will call for reevaluation.    Diagnoses and all orders for this visit:    Left hip pain    Left leg pain    Acute low back pain with left-sided sciatica, unspecified back pain laterality    Herniated lumbar disc without myelopathy    Low back pain, unspecified back pain laterality, unspecified chronicity, with sciatica presence unspecified          PLAN    Return if " symptoms worsen or fail to improve.    Pee Duran MD